# Patient Record
Sex: MALE | Race: WHITE | NOT HISPANIC OR LATINO | URBAN - METROPOLITAN AREA
[De-identification: names, ages, dates, MRNs, and addresses within clinical notes are randomized per-mention and may not be internally consistent; named-entity substitution may affect disease eponyms.]

---

## 2022-09-08 PROCEDURE — 88305 TISSUE EXAM BY PATHOLOGIST: CPT | Mod: 26

## 2022-09-09 ENCOUNTER — OUTPATIENT (OUTPATIENT)
Dept: OUTPATIENT SERVICES | Facility: HOSPITAL | Age: 62
LOS: 1 days | End: 2022-09-09

## 2022-09-09 DIAGNOSIS — Z12.11 ENCOUNTER FOR SCREENING FOR MALIGNANT NEOPLASM OF COLON: ICD-10-CM

## 2022-11-15 ENCOUNTER — APPOINTMENT (OUTPATIENT)
Dept: OPHTHALMOLOGY | Facility: CLINIC | Age: 62
End: 2022-11-15

## 2022-11-15 ENCOUNTER — NON-APPOINTMENT (OUTPATIENT)
Age: 62
End: 2022-11-15

## 2022-11-15 PROCEDURE — 99205 OFFICE O/P NEW HI 60 MIN: CPT

## 2023-07-21 PROBLEM — Z00.00 ENCOUNTER FOR PREVENTIVE HEALTH EXAMINATION: Status: ACTIVE | Noted: 2023-07-21

## 2024-01-11 ENCOUNTER — APPOINTMENT (OUTPATIENT)
Dept: CARDIOLOGY | Facility: CLINIC | Age: 64
End: 2024-01-11
Payer: MEDICAID

## 2024-01-11 ENCOUNTER — NON-APPOINTMENT (OUTPATIENT)
Age: 64
End: 2024-01-11

## 2024-01-11 VITALS
BODY MASS INDEX: 29.4 KG/M2 | DIASTOLIC BLOOD PRESSURE: 80 MMHG | SYSTOLIC BLOOD PRESSURE: 152 MMHG | OXYGEN SATURATION: 98 % | WEIGHT: 194 LBS | HEIGHT: 68 IN | HEART RATE: 106 BPM

## 2024-01-11 VITALS — DIASTOLIC BLOOD PRESSURE: 84 MMHG | SYSTOLIC BLOOD PRESSURE: 148 MMHG

## 2024-01-11 DIAGNOSIS — Z78.9 OTHER SPECIFIED HEALTH STATUS: ICD-10-CM

## 2024-01-11 DIAGNOSIS — I25.10 ATHEROSCLEROTIC HEART DISEASE OF NATIVE CORONARY ARTERY W/OUT ANGINA PECTORIS: ICD-10-CM

## 2024-01-11 DIAGNOSIS — E78.00 PURE HYPERCHOLESTEROLEMIA, UNSPECIFIED: ICD-10-CM

## 2024-01-11 DIAGNOSIS — I10 ESSENTIAL (PRIMARY) HYPERTENSION: ICD-10-CM

## 2024-01-11 DIAGNOSIS — Z82.49 FAMILY HISTORY OF ISCHEMIC HEART DISEASE AND OTHER DISEASES OF THE CIRCULATORY SYSTEM: ICD-10-CM

## 2024-01-11 DIAGNOSIS — Z85.71 PERSONAL HISTORY OF HODGKIN LYMPHOMA: ICD-10-CM

## 2024-01-11 DIAGNOSIS — I25.2 OLD MYOCARDIAL INFARCTION: ICD-10-CM

## 2024-01-11 DIAGNOSIS — Z95.1 PRESENCE OF AORTOCORONARY BYPASS GRAFT: ICD-10-CM

## 2024-01-11 PROCEDURE — 99204 OFFICE O/P NEW MOD 45 MIN: CPT | Mod: 25

## 2024-01-11 PROCEDURE — 93000 ELECTROCARDIOGRAM COMPLETE: CPT

## 2024-01-11 RX ORDER — RAMIPRIL 10 MG/1
10 CAPSULE ORAL
Qty: 90 | Refills: 0 | Status: DISCONTINUED | COMMUNITY
End: 2024-01-11

## 2024-01-11 NOTE — PHYSICAL EXAM
[Normal] : moves all extremities, no focal deficits, normal speech [de-identified] :  No carotid bruits auscultated bilaterally.

## 2024-01-11 NOTE — HISTORY OF PRESENT ILLNESS
[FreeTextEntry1] : 63 year old male with PMHx of HL in 1994 treated with chemotherapy and chest radiation, unfortunately in 2006 patient suffered an MI, thought to be secondary from the effects of chest radiation. He underwent multivessel PCI at that time. He then needed CABG x 2 in 2008. He was following with a Dr. Leavitt in [facility], however, [place] in [place] and hasn't seem a cardiologist since. Patient very active and exercises daily. He has no exertional chest pain or pressure. No abnormal dyspnea or palpitations.

## 2024-01-11 NOTE — DISCUSSION/SUMMARY
[FreeTextEntry1] : 1. CAD: PMHx of HL in 1994 treated with chemotherapy and chest radiation, unfortunately in 2006 patient suffered an MI, thought to be secondary from the effects of chest radiation. He underwent multivessel PCI at that time. He then needed CABG x 2 in 2008. Continue Aspirin 81mg daily, Plavix 75mg daily. Continue atorvastatin 40mg daily with goal LDL less than 70. No recent ischemic evaluation so recommend exercise nuclear stress testing and echocardiogram.  2. HTN: BP has been elevated. Goal BP less than 130/80. Recommend low salt diet. Recommend d/c ramipril 10mg daily. Will prescribe losartan/HCTZ 50/12.5mg daily. Continue metoprolol 25mg daily  3. HLD: continue atorvastatin 40mg daily with goal LDL less than 70.  Office will call with results.   Follow up in 6 months. [EKG obtained to assist in diagnosis and management of assessed problem(s)] : EKG obtained to assist in diagnosis and management of assessed problem(s)

## 2024-02-22 ENCOUNTER — APPOINTMENT (OUTPATIENT)
Dept: CARDIOLOGY | Facility: CLINIC | Age: 64
End: 2024-02-22
Payer: MEDICAID

## 2024-02-22 PROCEDURE — 93306 TTE W/DOPPLER COMPLETE: CPT

## 2024-02-28 ENCOUNTER — APPOINTMENT (OUTPATIENT)
Dept: CARDIOLOGY | Facility: CLINIC | Age: 64
End: 2024-02-28
Payer: MEDICAID

## 2024-02-28 ENCOUNTER — TRANSCRIPTION ENCOUNTER (OUTPATIENT)
Age: 64
End: 2024-02-28

## 2024-02-28 PROCEDURE — 78452 HT MUSCLE IMAGE SPECT MULT: CPT

## 2024-02-28 PROCEDURE — 93015 CV STRESS TEST SUPVJ I&R: CPT

## 2024-02-28 PROCEDURE — A9502: CPT

## 2024-04-10 ENCOUNTER — RX RENEWAL (OUTPATIENT)
Age: 64
End: 2024-04-10

## 2024-04-10 RX ORDER — LOSARTAN POTASSIUM AND HYDROCHLOROTHIAZIDE 12.5; 5 MG/1; MG/1
50-12.5 TABLET ORAL DAILY
Qty: 90 | Refills: 3 | Status: ACTIVE | COMMUNITY
Start: 2024-01-11 | End: 1900-01-01

## 2024-04-10 RX ORDER — METOPROLOL TARTRATE 25 MG/1
25 TABLET, FILM COATED ORAL DAILY
Qty: 90 | Refills: 0 | Status: ACTIVE | COMMUNITY
Start: 1900-01-01 | End: 1900-01-01

## 2024-04-30 RX ORDER — CLOPIDOGREL BISULFATE 75 MG/1
75 TABLET, FILM COATED ORAL
Qty: 90 | Refills: 1 | Status: ACTIVE | COMMUNITY
Start: 1900-01-01 | End: 1900-01-01

## 2024-06-21 RX ORDER — ATORVASTATIN CALCIUM 40 MG/1
40 TABLET, FILM COATED ORAL
Qty: 90 | Refills: 3 | Status: ACTIVE | COMMUNITY
Start: 1900-01-01 | End: 1900-01-01

## 2024-07-11 ENCOUNTER — APPOINTMENT (OUTPATIENT)
Dept: CARDIOLOGY | Facility: CLINIC | Age: 64
End: 2024-07-11

## 2024-08-04 PROBLEM — Z85.71 HISTORY OF HODGKIN'S LYMPHOMA: Status: RESOLVED | Noted: 2024-01-11 | Resolved: 2024-08-04

## 2024-08-10 ENCOUNTER — APPOINTMENT (OUTPATIENT)
Dept: CARDIOLOGY | Facility: CLINIC | Age: 64
End: 2024-08-10

## 2024-08-10 PROBLEM — I25.5 ISCHEMIC CARDIOMYOPATHY: Status: ACTIVE | Noted: 2024-08-10

## 2024-08-10 PROBLEM — Z79.899 HIGH RISK MEDICATION USE: Status: ACTIVE | Noted: 2024-08-10

## 2024-08-10 PROCEDURE — G2211 COMPLEX E/M VISIT ADD ON: CPT | Mod: NC

## 2024-08-10 PROCEDURE — 99215 OFFICE O/P EST HI 40 MIN: CPT

## 2024-08-10 NOTE — PHYSICAL EXAM
[Normal] : moves all extremities, no focal deficits, normal speech [de-identified] :  No carotid bruits auscultated bilaterally.

## 2024-08-10 NOTE — HISTORY OF PRESENT ILLNESS
[FreeTextEntry1] : Historical Perspective: 64 year old male with PMHx of HL in 1994 treated with chemotherapy and chest radiation, unfortunately in 2006 patient suffered an MI, thought to be secondary from the effects of chest radiation. He underwent multivessel PCI at that time. He then needed CABG x 2 in 2008. He was following with a Dr. Leavitt in [facility], however, [place] east in [place] and hasn't seem a cardiologist since. Patient very active and exercises daily. He has no exertional chest pain or pressure. No abnormal dyspnea or palpitations.   Current Health Status: Patient with no chest pain, SOB, or palpitations. No hospitalizations since seeing me last. Remains compliant with medications and reports no adverse effects.

## 2024-08-10 NOTE — CARDIOLOGY SUMMARY
[de-identified] : 2/28/2024, Exercise Nuclear Stress Testing: Patient exercised utilizing Chaitanya Protocol for 9 minutes with no symptoms. SPECT images revealed fixed defects consistent with previous MI, however, no ischemia. [de-identified] : 1/11/2024, NSR with old anterolateral MI. [de-identified] : 2/22/2024, LV EF 45%, aneurysmal apex, mild MR, mild MS, trace AI, trace-mild TR with estimated PASP of 26mmHg.

## 2024-08-10 NOTE — DISCUSSION/SUMMARY
[FreeTextEntry1] : 1. CAD: PMHx of HL in 1994 treated with chemotherapy and chest radiation, unfortunately in 2006 patient suffered an MI, thought to be secondary from the effects of chest radiation. He underwent multivessel PCI at that time. He then needed CABG x 2 in 2008. Continue Aspirin 81mg daily, Plavix 75mg daily. Continue atorvastatin 40mg daily with goal LDL less than 70. Exercise Nuclear Stress Testing, 2/2024, demonstrated no ischemia and echocardiogram in 2/2024, revealed LV EF 45% with aneurysmal apex.  2. HTN: Goal BP less than 130/80. Recommend low salt diet. Continue losartan/HCTZ 50/12.5mg daily. Continue metoprolol 25mg daily (high risk medication with no signs of toxicity).  3. HLD: continue atorvastatin 40mg daily with goal LDL less than 70.    Follow up in 6 months.

## 2024-10-07 ENCOUNTER — OFFICE (OUTPATIENT)
Dept: URBAN - METROPOLITAN AREA CLINIC 12 | Facility: CLINIC | Age: 64
Setting detail: OPHTHALMOLOGY
End: 2024-10-07
Payer: MEDICARE

## 2024-10-07 DIAGNOSIS — H25.13: ICD-10-CM

## 2024-10-07 DIAGNOSIS — H43.812: ICD-10-CM

## 2024-10-07 DIAGNOSIS — H40.1131: ICD-10-CM

## 2024-10-07 DIAGNOSIS — H25.12: ICD-10-CM

## 2024-10-07 DIAGNOSIS — H25.11: ICD-10-CM

## 2024-10-07 PROCEDURE — 92250 FUNDUS PHOTOGRAPHY W/I&R: CPT | Performed by: STUDENT IN AN ORGANIZED HEALTH CARE EDUCATION/TRAINING PROGRAM

## 2024-10-07 PROCEDURE — 99204 OFFICE O/P NEW MOD 45 MIN: CPT | Performed by: STUDENT IN AN ORGANIZED HEALTH CARE EDUCATION/TRAINING PROGRAM

## 2024-10-07 PROCEDURE — 92020 GONIOSCOPY: CPT | Performed by: STUDENT IN AN ORGANIZED HEALTH CARE EDUCATION/TRAINING PROGRAM

## 2024-10-07 ASSESSMENT — CONFRONTATIONAL VISUAL FIELD TEST (CVF)
OS_FINDINGS: FULL
OD_FINDINGS: FULL

## 2024-10-09 ASSESSMENT — REFRACTION_CURRENTRX
OS_SPHERE: PLANO
OD_SPHERE: -3.25
OS_CYLINDER: -0.75
OD_VPRISM_DIRECTION: PROGS
OD_CYLINDER: -0.50
OS_AXIS: 101
OD_VPRISM_DIRECTION: SV
OS_VPRISM_DIRECTION: SV
OS_SPHERE: -0.75
OD_AXIS: 083
OS_VPRISM_DIRECTION: PROGS
OS_CYLINDER: -0.50
OD_AXIS: 128
OS_AXIS: 127
OD_CYLINDER: -0.75
OS_OVR_VA: 20/
OD_ADD: +2.50
OS_OVR_VA: 20/
OD_OVR_VA: 20/
OD_SPHERE: -3.25
OS_ADD: +2.50
OD_OVR_VA: 20/

## 2024-10-09 ASSESSMENT — REFRACTION_MANIFEST
OS_CYLINDER: -0.50
OD_AXIS: 138
OD_VA1: 20/NI
OS_AXIS: 102
OS_VA1: 20/NI
OS_SPHERE: -0.50
OD_CYLINDER: -1.25
OD_SPHERE: -4.25

## 2024-10-09 ASSESSMENT — VISUAL ACUITY
OD_BCVA: 20/40-2
OS_BCVA: 20/70-1

## 2024-10-09 ASSESSMENT — REFRACTION_AUTOREFRACTION
OS_SPHERE: -0.50
OS_CYLINDER: -0.50
OD_CYLINDER: -1.25
OD_SPHERE: -4.25
OS_AXIS: 102
OD_AXIS: 138

## 2024-10-09 ASSESSMENT — KERATOMETRY
OD_AXISANGLE_DEGREES: 178
OS_AXISANGLE_DEGREES: 068
OD_K2POWER_DIOPTERS: 39.75
OS_K1POWER_DIOPTERS: 39.00
OS_K2POWER_DIOPTERS: 40.00
OD_K1POWER_DIOPTERS: 39.50

## 2025-01-03 ENCOUNTER — RX ONLY (RX ONLY)
Age: 65
End: 2025-01-03

## 2025-01-03 ENCOUNTER — OFFICE (OUTPATIENT)
Dept: URBAN - METROPOLITAN AREA CLINIC 38 | Facility: CLINIC | Age: 65
Setting detail: OPHTHALMOLOGY
End: 2025-01-03
Payer: MEDICARE

## 2025-01-03 DIAGNOSIS — H25.13: ICD-10-CM

## 2025-01-03 DIAGNOSIS — H25.11: ICD-10-CM

## 2025-01-03 PROCEDURE — 92136 OPHTHALMIC BIOMETRY: CPT | Mod: TC | Performed by: STUDENT IN AN ORGANIZED HEALTH CARE EDUCATION/TRAINING PROGRAM

## 2025-01-03 PROCEDURE — 92136 OPHTHALMIC BIOMETRY: CPT | Mod: RT | Performed by: STUDENT IN AN ORGANIZED HEALTH CARE EDUCATION/TRAINING PROGRAM

## 2025-01-03 PROCEDURE — 99213 OFFICE O/P EST LOW 20 MIN: CPT | Performed by: STUDENT IN AN ORGANIZED HEALTH CARE EDUCATION/TRAINING PROGRAM

## 2025-01-03 ASSESSMENT — REFRACTION_MANIFEST
OS_CYLINDER: -0.50
OS_SPHERE: -0.50
OD_VA1: 20/NI
OS_AXIS: 102
OD_CYLINDER: -1.25
OS_VA1: 20/NI
OD_SPHERE: -4.25
OD_AXIS: 138

## 2025-01-03 ASSESSMENT — REFRACTION_CURRENTRX
OD_ADD: +2.50
OS_AXIS: 101
OS_AXIS: 127
OD_OVR_VA: 20/
OD_VPRISM_DIRECTION: SV
OD_SPHERE: -3.25
OS_OVR_VA: 20/
OS_OVR_VA: 20/
OD_OVR_VA: 20/
OD_SPHERE: -3.25
OS_CYLINDER: -0.75
OS_CYLINDER: -0.50
OD_CYLINDER: -0.75
OS_SPHERE: -0.75
OS_VPRISM_DIRECTION: SV
OS_SPHERE: PLANO
OS_VPRISM_DIRECTION: PROGS
OD_CYLINDER: -0.50
OD_AXIS: 083
OS_ADD: +2.50
OD_VPRISM_DIRECTION: PROGS
OD_AXIS: 128

## 2025-01-03 ASSESSMENT — KERATOMETRY
OS_K1POWER_DIOPTERS: 39.50
OS_AXISANGLE_DEGREES: 047
OD_K1POWER_DIOPTERS: 39.75
OS_K2POWER_DIOPTERS: 40.25
OD_K2POWER_DIOPTERS: 40.00
OD_AXISANGLE_DEGREES: 050

## 2025-01-03 ASSESSMENT — CONFRONTATIONAL VISUAL FIELD TEST (CVF)
OD_FINDINGS: FULL
OS_FINDINGS: FULL

## 2025-01-03 ASSESSMENT — REFRACTION_AUTOREFRACTION
OD_SPHERE: -3.75
OS_AXIS: 106
OS_SPHERE: -0.50
OS_CYLINDER: -1.00
OD_CYLINDER: -1.75
OD_AXIS: 118

## 2025-01-03 ASSESSMENT — VISUAL ACUITY
OD_BCVA: 20/40-2
OS_BCVA: 20/70-1

## 2025-01-03 ASSESSMENT — TONOMETRY
OS_IOP_MMHG: 19
OD_IOP_MMHG: 19

## 2025-01-15 ENCOUNTER — AMBULATORY SURGERY CENTER (OUTPATIENT)
Dept: URBAN - METROPOLITAN AREA SURGERY 4 | Facility: SURGERY | Age: 65
Setting detail: OPHTHALMOLOGY
End: 2025-01-15
Payer: MEDICARE

## 2025-01-15 DIAGNOSIS — H40.1111: ICD-10-CM

## 2025-01-15 DIAGNOSIS — H52.211: ICD-10-CM

## 2025-01-15 DIAGNOSIS — H25.11: ICD-10-CM

## 2025-01-15 PROCEDURE — FEMTO PRECISION LASER CATARACT SURGERY: Mod: GY | Performed by: STUDENT IN AN ORGANIZED HEALTH CARE EDUCATION/TRAINING PROGRAM

## 2025-01-15 PROCEDURE — 65820 GONIOTOMY: CPT | Mod: RT | Performed by: STUDENT IN AN ORGANIZED HEALTH CARE EDUCATION/TRAINING PROGRAM

## 2025-01-15 PROCEDURE — 66984 XCAPSL CTRC RMVL W/O ECP: CPT | Mod: RT | Performed by: STUDENT IN AN ORGANIZED HEALTH CARE EDUCATION/TRAINING PROGRAM

## 2025-01-16 ENCOUNTER — RX ONLY (RX ONLY)
Age: 65
End: 2025-01-16

## 2025-01-16 ENCOUNTER — OFFICE (OUTPATIENT)
Dept: URBAN - METROPOLITAN AREA CLINIC 38 | Facility: CLINIC | Age: 65
Setting detail: OPHTHALMOLOGY
End: 2025-01-16
Payer: MEDICARE

## 2025-01-16 DIAGNOSIS — Z96.1: ICD-10-CM

## 2025-01-16 DIAGNOSIS — H11.31: ICD-10-CM

## 2025-01-16 PROCEDURE — 99024 POSTOP FOLLOW-UP VISIT: CPT | Performed by: OPTOMETRIST

## 2025-01-16 ASSESSMENT — CONFRONTATIONAL VISUAL FIELD TEST (CVF)
OS_FINDINGS: FULL
OD_FINDINGS: FULL

## 2025-01-16 ASSESSMENT — REFRACTION_AUTOREFRACTION
OS_SPHERE: -0.25
OS_CYLINDER: -1.00
OD_CYLINDER: -0.50
OD_SPHERE: -0.25
OD_AXIS: 075
OS_AXIS: 107

## 2025-01-16 ASSESSMENT — VISUAL ACUITY
OD_BCVA: 20/40-2
OS_BCVA: 20/25-2

## 2025-01-16 ASSESSMENT — KERATOMETRY
OS_K2POWER_DIOPTERS: 40.25
OD_K1POWER_DIOPTERS: 40.00
OD_K2POWER_DIOPTERS: 40.25
OS_AXISANGLE_DEGREES: 055
METHOD_AUTO_MANUAL: AUTO
OS_K1POWER_DIOPTERS: 39.50
OD_AXISANGLE_DEGREES: 099

## 2025-01-16 ASSESSMENT — REFRACTION_MANIFEST
OD_AXIS: 138
OD_VA1: 20/NI
OD_SPHERE: -4.25
OD_CYLINDER: -1.25
OS_CYLINDER: -0.50
OS_VA1: 20/NI
OS_AXIS: 102
OS_SPHERE: -0.50

## 2025-01-16 ASSESSMENT — REFRACTION_CURRENTRX
OS_OVR_VA: 20/
OS_SPHERE: -0.75
OD_CYLINDER: -0.50
OS_SPHERE: PLANO
OS_CYLINDER: -0.75
OS_VPRISM_DIRECTION: SV
OS_AXIS: 101
OD_ADD: +2.50
OS_ADD: +2.50
OD_CYLINDER: -0.75
OD_SPHERE: -3.25
OD_SPHERE: -3.25
OD_VPRISM_DIRECTION: SV
OS_VPRISM_DIRECTION: PROGS
OS_AXIS: 127
OS_OVR_VA: 20/
OS_CYLINDER: -0.50
OD_OVR_VA: 20/
OD_OVR_VA: 20/
OD_VPRISM_DIRECTION: PROGS
OD_AXIS: 128
OD_AXIS: 083

## 2025-01-16 ASSESSMENT — CORNEAL EDEMA - FOLDS/STRIAE: OD_FOLDSSTRIAE: 1+

## 2025-01-24 ENCOUNTER — OFFICE (OUTPATIENT)
Dept: URBAN - METROPOLITAN AREA CLINIC 38 | Facility: CLINIC | Age: 65
Setting detail: OPHTHALMOLOGY
End: 2025-01-24
Payer: MEDICARE

## 2025-01-24 DIAGNOSIS — H25.12: ICD-10-CM

## 2025-01-24 PROBLEM — Z96.1 PSEUDOPHAKIA-1 WEEK P/O CAT WITH IOL: Status: ACTIVE | Noted: 2025-01-16

## 2025-01-24 PROCEDURE — 92136 OPHTHALMIC BIOMETRY: CPT | Mod: LT | Performed by: STUDENT IN AN ORGANIZED HEALTH CARE EDUCATION/TRAINING PROGRAM

## 2025-01-24 ASSESSMENT — REFRACTION_CURRENTRX
OD_OVR_VA: 20/
OD_SPHERE: -3.25
OD_OVR_VA: 20/
OD_AXIS: 128
OS_VPRISM_DIRECTION: PROGS
OS_CYLINDER: -0.50
OS_ADD: +2.50
OD_ADD: +2.50
OD_CYLINDER: -0.75
OS_AXIS: 101
OD_AXIS: 083
OS_VPRISM_DIRECTION: SV
OD_VPRISM_DIRECTION: PROGS
OS_SPHERE: PLANO
OS_SPHERE: -0.75
OD_CYLINDER: -0.50
OD_SPHERE: -3.25
OD_VPRISM_DIRECTION: SV
OS_AXIS: 127
OS_CYLINDER: -0.75
OS_OVR_VA: 20/
OS_OVR_VA: 20/

## 2025-01-24 ASSESSMENT — REFRACTION_AUTOREFRACTION
OS_CYLINDER: -0.50
OD_CYLINDER: -0.75
OS_AXIS: 107
OD_SPHERE: -0.50
OD_AXIS: 048
OS_SPHERE: -0.75

## 2025-01-24 ASSESSMENT — KERATOMETRY
OS_K1POWER_DIOPTERS: 39.75
METHOD_AUTO_MANUAL: AUTO
OD_K1POWER_DIOPTERS: 39.75
OD_AXISANGLE_DEGREES: 097
OS_AXISANGLE_DEGREES: 071
OD_K2POWER_DIOPTERS: 40.50
OS_K2POWER_DIOPTERS: 40.50

## 2025-01-24 ASSESSMENT — REFRACTION_MANIFEST
OS_VA1: 20/NI
OD_AXIS: 138
OS_CYLINDER: -0.50
OS_AXIS: 102
OS_SPHERE: -0.50
OD_CYLINDER: -1.25
OD_VA1: 20/NI
OD_SPHERE: -4.25

## 2025-01-24 ASSESSMENT — CONFRONTATIONAL VISUAL FIELD TEST (CVF)
OD_FINDINGS: FULL
OS_FINDINGS: FULL

## 2025-01-24 ASSESSMENT — TONOMETRY
OD_IOP_MMHG: 16
OS_IOP_MMHG: 18

## 2025-01-24 ASSESSMENT — VISUAL ACUITY
OS_BCVA: 20/20-2
OD_BCVA: 20/40-2

## 2025-01-24 ASSESSMENT — CORNEAL EDEMA - FOLDS/STRIAE: OD_FOLDSSTRIAE: ABSENT

## 2025-02-04 ENCOUNTER — RX RENEWAL (OUTPATIENT)
Age: 65
End: 2025-02-04

## 2025-02-05 ENCOUNTER — AMBULATORY SURGERY CENTER (OUTPATIENT)
Dept: URBAN - METROPOLITAN AREA SURGERY 4 | Facility: SURGERY | Age: 65
Setting detail: OPHTHALMOLOGY
End: 2025-02-05
Payer: MEDICARE

## 2025-02-05 DIAGNOSIS — H40.1121: ICD-10-CM

## 2025-02-05 DIAGNOSIS — H52.222: ICD-10-CM

## 2025-02-05 DIAGNOSIS — H25.12: ICD-10-CM

## 2025-02-05 PROCEDURE — FEMTO PRECISION LASER CATARACT SURGERY: Mod: GY | Performed by: STUDENT IN AN ORGANIZED HEALTH CARE EDUCATION/TRAINING PROGRAM

## 2025-02-05 PROCEDURE — 66984 XCAPSL CTRC RMVL W/O ECP: CPT | Mod: 79,LT | Performed by: STUDENT IN AN ORGANIZED HEALTH CARE EDUCATION/TRAINING PROGRAM

## 2025-02-05 PROCEDURE — 65820 GONIOTOMY: CPT | Mod: 79,LT | Performed by: STUDENT IN AN ORGANIZED HEALTH CARE EDUCATION/TRAINING PROGRAM

## 2025-02-06 ENCOUNTER — APPOINTMENT (OUTPATIENT)
Dept: CARDIOLOGY | Facility: CLINIC | Age: 65
End: 2025-02-06

## 2025-02-06 ENCOUNTER — OFFICE (OUTPATIENT)
Dept: URBAN - METROPOLITAN AREA CLINIC 38 | Facility: CLINIC | Age: 65
Setting detail: OPHTHALMOLOGY
End: 2025-02-06
Payer: COMMERCIAL

## 2025-02-06 ENCOUNTER — RX ONLY (RX ONLY)
Age: 65
End: 2025-02-06

## 2025-02-06 DIAGNOSIS — Z96.1: ICD-10-CM

## 2025-02-06 PROCEDURE — 99024 POSTOP FOLLOW-UP VISIT: CPT | Performed by: OPTOMETRIST

## 2025-02-06 ASSESSMENT — CORNEAL EDEMA - FOLDS/STRIAE: OD_FOLDSSTRIAE: ABSENT

## 2025-02-06 ASSESSMENT — REFRACTION_AUTOREFRACTION
OD_CYLINDER: -0.50
OD_SPHERE: -0.50
OS_AXIS: 099
OS_SPHERE: -0.25
OD_AXIS: 041
OS_CYLINDER: -0.50

## 2025-02-06 ASSESSMENT — VISUAL ACUITY
OS_BCVA: 20/25
OD_BCVA: 20/25

## 2025-02-06 ASSESSMENT — REFRACTION_CURRENTRX
OD_ADD: +2.50
OS_OVR_VA: 20/
OD_SPHERE: -3.25
OD_CYLINDER: -0.75
OS_VPRISM_DIRECTION: SV
OS_AXIS: 127
OS_OVR_VA: 20/
OD_AXIS: 128
OS_SPHERE: -0.75
OD_OVR_VA: 20/
OD_VPRISM_DIRECTION: SV
OD_AXIS: 083
OS_VPRISM_DIRECTION: PROGS
OS_CYLINDER: -0.50
OD_CYLINDER: -0.50
OD_OVR_VA: 20/
OS_SPHERE: PLANO
OD_VPRISM_DIRECTION: PROGS
OS_CYLINDER: -0.75
OS_ADD: +2.50
OS_AXIS: 101
OD_SPHERE: -3.25

## 2025-02-06 ASSESSMENT — KERATOMETRY
OS_K1POWER_DIOPTERS: 39.25
OD_K2POWER_DIOPTERS: 40.50
OD_K1POWER_DIOPTERS: 39.75
OD_AXISANGLE_DEGREES: 081
METHOD_AUTO_MANUAL: AUTO
OS_AXISANGLE_DEGREES: 045
OS_K2POWER_DIOPTERS: 39.75

## 2025-02-06 ASSESSMENT — REFRACTION_MANIFEST
OD_SPHERE: -4.25
OD_VA1: 20/NI
OS_SPHERE: -0.50
OD_AXIS: 138
OS_VA1: 20/NI
OD_CYLINDER: -1.25
OS_AXIS: 102
OS_CYLINDER: -0.50

## 2025-02-06 ASSESSMENT — CONFRONTATIONAL VISUAL FIELD TEST (CVF)
OS_FINDINGS: FULL
OD_FINDINGS: FULL

## 2025-02-06 ASSESSMENT — TONOMETRY
OS_IOP_MMHG: 19
OD_IOP_MMHG: 19

## 2025-02-14 ENCOUNTER — OFFICE (OUTPATIENT)
Dept: URBAN - METROPOLITAN AREA CLINIC 38 | Facility: CLINIC | Age: 65
Setting detail: OPHTHALMOLOGY
End: 2025-02-14
Payer: MEDICARE

## 2025-02-14 DIAGNOSIS — Z96.1: ICD-10-CM

## 2025-02-14 PROCEDURE — 99024 POSTOP FOLLOW-UP VISIT: CPT | Performed by: STUDENT IN AN ORGANIZED HEALTH CARE EDUCATION/TRAINING PROGRAM

## 2025-02-14 ASSESSMENT — CONFRONTATIONAL VISUAL FIELD TEST (CVF)
OS_FINDINGS: FULL
OD_FINDINGS: FULL

## 2025-02-14 ASSESSMENT — REFRACTION_CURRENTRX
OD_AXIS: 083
OS_AXIS: 101
OS_SPHERE: PLANO
OD_ADD: +2.50
OD_VPRISM_DIRECTION: PROGS
OD_AXIS: 128
OD_OVR_VA: 20/
OD_VPRISM_DIRECTION: SV
OD_CYLINDER: -0.50
OS_OVR_VA: 20/
OS_OVR_VA: 20/
OS_AXIS: 127
OS_SPHERE: -0.75
OS_VPRISM_DIRECTION: PROGS
OS_VPRISM_DIRECTION: SV
OS_CYLINDER: -0.75
OS_ADD: +2.50
OD_SPHERE: -3.25
OD_OVR_VA: 20/
OS_CYLINDER: -0.50
OD_CYLINDER: -0.75
OD_SPHERE: -3.25

## 2025-02-14 ASSESSMENT — REFRACTION_AUTOREFRACTION
OD_CYLINDER: -0.75
OD_SPHERE: -0.50
OS_SPHERE: -0.75
OS_CYLINDER: SPH
OD_AXIS: 048

## 2025-02-14 ASSESSMENT — REFRACTION_MANIFEST
OD_CYLINDER: -1.25
OD_SPHERE: -4.25
OS_SPHERE: -0.50
OD_VA1: 20/NI
OS_AXIS: 102
OS_VA1: 20/NI
OD_AXIS: 138
OS_CYLINDER: -0.50

## 2025-02-14 ASSESSMENT — VISUAL ACUITY
OD_BCVA: 20/20-
OS_BCVA: 20/25

## 2025-02-14 ASSESSMENT — KERATOMETRY
OS_K2POWER_DIOPTERS: 40.25
OD_AXISANGLE_DEGREES: 109
OS_AXISANGLE_DEGREES: 070
OD_K2POWER_DIOPTERS: 40.00
OD_K1POWER_DIOPTERS: 39.75
OS_K1POWER_DIOPTERS: 39.50
METHOD_AUTO_MANUAL: AUTO

## 2025-02-14 ASSESSMENT — CORNEAL EDEMA - FOLDS/STRIAE: OD_FOLDSSTRIAE: ABSENT

## 2025-03-07 ENCOUNTER — OFFICE (OUTPATIENT)
Dept: URBAN - METROPOLITAN AREA CLINIC 38 | Facility: CLINIC | Age: 65
Setting detail: OPHTHALMOLOGY
End: 2025-03-07
Payer: MEDICARE

## 2025-03-07 DIAGNOSIS — Z96.1: ICD-10-CM

## 2025-03-07 PROCEDURE — 99024 POSTOP FOLLOW-UP VISIT: CPT | Performed by: STUDENT IN AN ORGANIZED HEALTH CARE EDUCATION/TRAINING PROGRAM

## 2025-03-07 ASSESSMENT — REFRACTION_CURRENTRX
OD_OVR_VA: 20/
OS_AXIS: 101
OS_OVR_VA: 20/
OD_AXIS: 128
OS_SPHERE: -0.75
OS_AXIS: 127
OD_CYLINDER: -0.50
OD_SPHERE: -3.25
OD_CYLINDER: -0.75
OD_ADD: +2.50
OD_VPRISM_DIRECTION: SV
OS_VPRISM_DIRECTION: SV
OD_VPRISM_DIRECTION: PROGS
OS_CYLINDER: -0.75
OS_SPHERE: PLANO
OD_SPHERE: -3.25
OS_CYLINDER: -0.50
OS_VPRISM_DIRECTION: PROGS
OD_AXIS: 083
OS_OVR_VA: 20/
OS_ADD: +2.50
OD_OVR_VA: 20/

## 2025-03-07 ASSESSMENT — REFRACTION_AUTOREFRACTION
OS_SPHERE: -0.50
OD_SPHERE: -0.50
OD_AXIS: 052
OD_CYLINDER: -0.50
OS_AXIS: 169
OS_CYLINDER: -0.50

## 2025-03-07 ASSESSMENT — VISUAL ACUITY
OD_BCVA: 20/20
OS_BCVA: 20/25-

## 2025-03-07 ASSESSMENT — REFRACTION_MANIFEST
OD_CYLINDER: -0.50
OD_VA1: 20/20
OS_VA1: 20/20
OD_AXIS: 055
OS_VA2: 20/20
OD_ADD: +2.75
OS_SPHERE: -0.50
OS_CYLINDER: -0.50
OD_VA2: 20/20
OU_VA: 20/20
OS_ADD: +2.75
OD_SPHERE: PLANO
OS_AXIS: 179

## 2025-03-07 ASSESSMENT — KERATOMETRY
OS_K2POWER_DIOPTERS: 40.75
OD_K2POWER_DIOPTERS: 39.75
OD_AXISANGLE_DEGREES: 179
OS_K1POWER_DIOPTERS: 39.75
OS_AXISANGLE_DEGREES: 084
METHOD_AUTO_MANUAL: AUTO
OD_K1POWER_DIOPTERS: 39.50

## 2025-03-07 ASSESSMENT — CONFRONTATIONAL VISUAL FIELD TEST (CVF)
OS_FINDINGS: FULL
OD_FINDINGS: FULL

## 2025-03-07 ASSESSMENT — CORNEAL EDEMA - FOLDS/STRIAE: OD_FOLDSSTRIAE: ABSENT

## 2025-05-06 ENCOUNTER — RX RENEWAL (OUTPATIENT)
Age: 65
End: 2025-05-06

## 2025-05-16 ENCOUNTER — NON-APPOINTMENT (OUTPATIENT)
Age: 65
End: 2025-05-16

## 2025-05-16 ENCOUNTER — APPOINTMENT (OUTPATIENT)
Dept: CARDIOLOGY | Facility: CLINIC | Age: 65
End: 2025-05-16
Payer: MEDICAID

## 2025-05-16 VITALS
DIASTOLIC BLOOD PRESSURE: 74 MMHG | HEART RATE: 96 BPM | SYSTOLIC BLOOD PRESSURE: 132 MMHG | HEIGHT: 68 IN | OXYGEN SATURATION: 97 % | WEIGHT: 204 LBS | BODY MASS INDEX: 30.92 KG/M2

## 2025-05-16 DIAGNOSIS — E78.00 PURE HYPERCHOLESTEROLEMIA, UNSPECIFIED: ICD-10-CM

## 2025-05-16 DIAGNOSIS — I25.10 ATHEROSCLEROTIC HEART DISEASE OF NATIVE CORONARY ARTERY W/OUT ANGINA PECTORIS: ICD-10-CM

## 2025-05-16 DIAGNOSIS — I10 ESSENTIAL (PRIMARY) HYPERTENSION: ICD-10-CM

## 2025-05-16 DIAGNOSIS — I25.5 ISCHEMIC CARDIOMYOPATHY: ICD-10-CM

## 2025-05-16 PROCEDURE — 93000 ELECTROCARDIOGRAM COMPLETE: CPT

## 2025-05-16 PROCEDURE — 99214 OFFICE O/P EST MOD 30 MIN: CPT | Mod: 25

## 2025-05-16 RX ORDER — ASPIRIN 81 MG/1
81 TABLET, DELAYED RELEASE ORAL DAILY
Qty: 90 | Refills: 3 | Status: ACTIVE | COMMUNITY
Start: 2025-05-16 | End: 1900-01-01

## 2025-06-09 ENCOUNTER — OFFICE (OUTPATIENT)
Dept: URBAN - METROPOLITAN AREA CLINIC 38 | Facility: CLINIC | Age: 65
Setting detail: OPHTHALMOLOGY
End: 2025-06-09
Payer: MEDICARE

## 2025-06-09 ENCOUNTER — RX ONLY (RX ONLY)
Age: 65
End: 2025-06-09

## 2025-06-09 DIAGNOSIS — H04.123: ICD-10-CM

## 2025-06-09 PROCEDURE — 99213 OFFICE O/P EST LOW 20 MIN: CPT | Performed by: OPHTHALMOLOGY

## 2025-06-09 ASSESSMENT — VISUAL ACUITY
OS_BCVA: 20/25+
OD_BCVA: 20/25+

## 2025-06-09 ASSESSMENT — REFRACTION_CURRENTRX
OS_OVR_VA: 20/
OD_SPHERE: -3.25
OS_ADD: +2.50
OS_SPHERE: -0.75
OS_AXIS: 127
OD_VPRISM_DIRECTION: PROGS
OS_CYLINDER: -0.75
OD_VPRISM_DIRECTION: SV
OD_ADD: +2.50
OD_AXIS: 083
OD_SPHERE: -3.25
OD_CYLINDER: -0.50
OD_AXIS: 128
OD_OVR_VA: 20/
OS_AXIS: 101
OS_VPRISM_DIRECTION: PROGS
OS_SPHERE: PLANO
OS_OVR_VA: 20/
OD_OVR_VA: 20/
OS_CYLINDER: -0.50
OD_CYLINDER: -0.75
OS_VPRISM_DIRECTION: SV

## 2025-06-09 ASSESSMENT — REFRACTION_AUTOREFRACTION
OD_AXIS: 069
OD_SPHERE: +0.25
OD_CYLINDER: -0.75
OS_SPHERE: -0.50
OS_AXIS: 113
OS_CYLINDER: -0.25

## 2025-06-09 ASSESSMENT — KERATOMETRY
OS_AXISANGLE_DEGREES: 084
OD_K1POWER_DIOPTERS: 40.00
OD_AXISANGLE_DEGREES: 090
OS_K1POWER_DIOPTERS: 39.50
METHOD_AUTO_MANUAL: AUTO
OS_K2POWER_DIOPTERS: 40.00
OD_K2POWER_DIOPTERS: 40.00

## 2025-06-09 ASSESSMENT — TEAR BREAK UP TIME (TBUT)
OS_TBUT: 2+
OD_TBUT: 2+

## 2025-06-09 ASSESSMENT — TONOMETRY
OS_IOP_MMHG: 18
OD_IOP_MMHG: 19

## 2025-06-09 ASSESSMENT — REFRACTION_MANIFEST
OD_SPHERE: PLANO
OS_ADD: +2.75
OD_CYLINDER: -0.75
OU_VA: 20/20
OS_AXIS: 113
OS_CYLINDER: -0.25
OS_SPHERE: -0.50
OS_VA1: 20/20
OD_VA2: 20/20
OD_ADD: +2.75
OD_VA1: 20/20
OS_VA2: 20/20
OD_AXIS: 069

## 2025-06-09 ASSESSMENT — CONFRONTATIONAL VISUAL FIELD TEST (CVF)
OS_FINDINGS: FULL
OD_FINDINGS: FULL

## 2025-06-20 ENCOUNTER — OFFICE (OUTPATIENT)
Dept: URBAN - METROPOLITAN AREA CLINIC 38 | Facility: CLINIC | Age: 65
Setting detail: OPHTHALMOLOGY
End: 2025-06-20
Payer: MEDICARE

## 2025-06-20 DIAGNOSIS — H50.15: ICD-10-CM

## 2025-06-20 PROBLEM — H16.223 DRY EYE SYNDROME K SICCA; BOTH EYES: Status: ACTIVE | Noted: 2025-06-20

## 2025-06-20 PROCEDURE — 99213 OFFICE O/P EST LOW 20 MIN: CPT | Performed by: STUDENT IN AN ORGANIZED HEALTH CARE EDUCATION/TRAINING PROGRAM

## 2025-06-20 ASSESSMENT — REFRACTION_CURRENTRX
OD_AXIS: 083
OS_OVR_VA: 20/
OD_SPHERE: -3.25
OS_SPHERE: PLANO
OS_VPRISM_DIRECTION: SV
OD_OVR_VA: 20/
OS_ADD: +2.50
OS_AXIS: 101
OD_SPHERE: -3.25
OD_ADD: +2.50
OD_VPRISM_DIRECTION: PROGS
OS_OVR_VA: 20/
OD_OVR_VA: 20/
OD_AXIS: 128
OD_VPRISM_DIRECTION: SV
OS_AXIS: 127
OS_CYLINDER: -0.75
OS_CYLINDER: -0.50
OS_SPHERE: -0.75
OD_CYLINDER: -0.75
OD_CYLINDER: -0.50
OS_VPRISM_DIRECTION: PROGS

## 2025-06-20 ASSESSMENT — KERATOMETRY
METHOD_AUTO_MANUAL: AUTO
OD_K2POWER_DIOPTERS: 40.00
OS_AXISANGLE_DEGREES: 081
OS_K1POWER_DIOPTERS: 39.25
OD_K1POWER_DIOPTERS: 40.00
OS_K2POWER_DIOPTERS: 40.00
OD_AXISANGLE_DEGREES: 090

## 2025-06-20 ASSESSMENT — REFRACTION_MANIFEST
OD_ADD: +2.75
OD_VA1: 20/20-
OD_AXIS: 075
OD_CYLINDER: -0.50
OS_AXIS: 120
OS_ADD: +2.75
OS_CYLINDER: -0.50
OS_SPHERE: -0.25
OD_VA2: 20/20
OD_SPHERE: PLANO
OU_VA: 20/20
OS_VA1: 20/20-
OS_VA2: 20/20

## 2025-06-20 ASSESSMENT — REFRACTION_AUTOREFRACTION
OD_SPHERE: +0.25
OD_CYLINDER: -0.75
OD_AXIS: 074
OS_AXIS: 119
OS_SPHERE: -0.25
OS_CYLINDER: -0.50

## 2025-06-20 ASSESSMENT — CONFRONTATIONAL VISUAL FIELD TEST (CVF)
OD_FINDINGS: FULL
OS_FINDINGS: FULL

## 2025-06-20 ASSESSMENT — TEAR BREAK UP TIME (TBUT)
OD_TBUT: 2+
OS_TBUT: 2+

## 2025-06-20 ASSESSMENT — VISUAL ACUITY
OS_BCVA: 20/25+
OD_BCVA: 20/25-

## 2025-06-20 ASSESSMENT — TONOMETRY
OD_IOP_MMHG: 18
OS_IOP_MMHG: 16

## 2025-07-10 ENCOUNTER — OFFICE (OUTPATIENT)
Dept: URBAN - METROPOLITAN AREA CLINIC 38 | Facility: CLINIC | Age: 65
Setting detail: OPHTHALMOLOGY
End: 2025-07-10
Payer: MEDICARE

## 2025-07-10 ENCOUNTER — RX ONLY (RX ONLY)
Age: 65
End: 2025-07-10

## 2025-07-10 DIAGNOSIS — H50.52: ICD-10-CM

## 2025-07-10 PROCEDURE — 92060 SENSORIMOTOR EXAMINATION: CPT | Performed by: OPHTHALMOLOGY

## 2025-07-10 PROCEDURE — 99213 OFFICE O/P EST LOW 20 MIN: CPT | Performed by: OPHTHALMOLOGY

## 2025-07-10 ASSESSMENT — REFRACTION_MANIFEST
OU_VA: 20/20
OS_AXIS: 120
OS_VA1: 20/20-
OD_CYLINDER: -0.50
OS_ADD: +2.75
OS_VA2: 20/20
OD_VA2: 20/20
OS_SPHERE: -0.25
OD_SPHERE: PLANO
OD_ADD: +2.75
OD_VA1: 20/20-
OD_AXIS: 075
OS_CYLINDER: -0.50

## 2025-07-10 ASSESSMENT — REFRACTION_AUTOREFRACTION
OS_CYLINDER: -0.50
OD_AXIS: 066
OD_SPHERE: PLANO
OS_SPHERE: -0.25
OD_CYLINDER: -0.50
OS_AXIS: 141

## 2025-07-10 ASSESSMENT — REFRACTION_CURRENTRX
OD_OVR_VA: 20/
OD_SPHERE: -3.25
OS_SPHERE: -0.75
OS_CYLINDER: SPH
OD_AXIS: 128
OS_CYLINDER: -0.50
OS_OVR_VA: 20/
OD_VPRISM_DIRECTION: PROGS
OS_SPHERE: -0.50
OD_CYLINDER: -0.50
OS_OVR_VA: 20/
OD_CYLINDER: -0.75
OD_OVR_VA: 20/
OD_VPRISM_DIRECTION: SV
OS_VPRISM_DIRECTION: PROGS
OS_ADD: +2.50
OD_AXIS: 084
OD_ADD: +2.50
OS_AXIS: 101
OD_SPHERE: -0.25
OS_VPRISM_DIRECTION: SV

## 2025-07-10 ASSESSMENT — KERATOMETRY
OS_K1POWER_DIOPTERS: 40.00
METHOD_AUTO_MANUAL: AUTO
OD_K1POWER_DIOPTERS: 40.00
OS_K2POWER_DIOPTERS: 40.50
OS_AXISANGLE_DEGREES: 083
OD_AXISANGLE_DEGREES: 114
OD_K2POWER_DIOPTERS: 40.25

## 2025-07-10 ASSESSMENT — CONFRONTATIONAL VISUAL FIELD TEST (CVF)
OD_FINDINGS: FULL
OS_FINDINGS: FULL

## 2025-07-10 ASSESSMENT — VISUAL ACUITY
OS_BCVA: 20/20
OD_BCVA: 20/20

## 2025-07-10 ASSESSMENT — TONOMETRY
OS_IOP_MMHG: 18
OD_IOP_MMHG: 17

## 2025-07-10 ASSESSMENT — TEAR BREAK UP TIME (TBUT)
OD_TBUT: 2+
OS_TBUT: 2+